# Patient Record
Sex: FEMALE | Race: WHITE | Employment: FULL TIME | URBAN - METROPOLITAN AREA
[De-identification: names, ages, dates, MRNs, and addresses within clinical notes are randomized per-mention and may not be internally consistent; named-entity substitution may affect disease eponyms.]

---

## 2022-08-11 ENCOUNTER — OFFICE VISIT (OUTPATIENT)
Dept: URGENT CARE | Age: 67
End: 2022-08-11
Payer: MEDICARE

## 2022-08-11 VITALS
HEART RATE: 50 BPM | SYSTOLIC BLOOD PRESSURE: 143 MMHG | DIASTOLIC BLOOD PRESSURE: 85 MMHG | OXYGEN SATURATION: 96 % | HEIGHT: 65 IN | TEMPERATURE: 97.2 F | BODY MASS INDEX: 28.99 KG/M2 | WEIGHT: 174 LBS | RESPIRATION RATE: 16 BRPM

## 2022-08-11 DIAGNOSIS — H00.014 HORDEOLUM EXTERNUM OF LEFT UPPER EYELID: Primary | ICD-10-CM

## 2022-08-11 DIAGNOSIS — H01.004 BLEPHARITIS OF LEFT UPPER EYELID, UNSPECIFIED TYPE: ICD-10-CM

## 2022-08-11 PROCEDURE — 1123F ACP DISCUSS/DSCN MKR DOCD: CPT | Performed by: NURSE PRACTITIONER

## 2022-08-11 PROCEDURE — 99203 OFFICE O/P NEW LOW 30 MIN: CPT | Performed by: NURSE PRACTITIONER

## 2022-08-11 RX ORDER — FLECAINIDE ACETATE 50 MG/1
50 TABLET ORAL 2 TIMES DAILY
COMMUNITY

## 2022-08-11 RX ORDER — DOXYCYCLINE 100 MG/1
100 TABLET ORAL 2 TIMES DAILY
Qty: 20 TABLET | Refills: 0 | Status: SHIPPED | OUTPATIENT
Start: 2022-08-11 | End: 2022-08-21

## 2022-08-11 RX ORDER — ATORVASTATIN CALCIUM 20 MG/1
20 TABLET, FILM COATED ORAL DAILY
COMMUNITY

## 2022-08-11 RX ORDER — ASPIRIN 325 MG
325 TABLET ORAL DAILY
COMMUNITY

## 2022-08-11 RX ORDER — LORATADINE 10 MG/1
10 TABLET ORAL
COMMUNITY

## 2022-08-11 RX ORDER — METOPROLOL TARTRATE 25 MG/1
25 TABLET, FILM COATED ORAL 2 TIMES DAILY
COMMUNITY

## 2022-08-11 NOTE — PROGRESS NOTES
Here for left eye lid swelling  Onset 3 days ago  No injury  Has had multiple flare ups in past  Been diagnosed with blepharitis   Denies fever, severe eye pain or blurred vision       Past Medical History:   Diagnosis Date    Cancer ([facility] [address] 1987,     LEFT BREAST    Cancer ([facility] [address]     BCCA    Cancer ([facility] [address] 2012    R BREAST    GERD (gastroesophageal reflux disease)     no problems since chemo    Other ill-defined conditions(799.89)     migraines    Other ill-defined conditions(799.89)     KIDNEY STONES    Seizures ([facility] [address]     AS A CHILD, due to trauma        Past Surgical History:   Procedure Laterality Date    HX AFIB ABLATION      HX BREAST AUGMENTATION  05/07/2013    BREAST CAPSULECTOMY performed by Lei Hernandez MD at [place] [id number]    HX CATARACT REMOVAL      BILATERAL    HX HEENT      TMJ sgy, 2008    HX MASTECTOMY  01/01/1987    LEFT    HX MASTECTOMY  02/01/2013    RIGHT    HX OTHER SURGICAL      basal cell carcinoma removed from nose     HX OTHER SURGICAL      PORTACATH, right , REMOVED 2/2013    HX TONSILLECTOMY      HX VASCULAR ACCESS      ALEJANDRA CATH REMOVED 2/2013         Family History   Problem Relation Age of Onset    Cancer Mother         LUNG    Cancer Maternal Grandmother         UTERINE    Anesth Problems Neg Hx         Social History     Socioeconomic History    Marital status:      Spouse name: Not on file    Number of children: Not on file    Years of education: Not on file    Highest education level: Not on file   Occupational History    Not on file   Tobacco Use    Smoking status: Never    Smokeless tobacco: Never   Substance and Sexual Activity    Alcohol use:  Yes     Alcohol/week: 1.7 standard drinks     Types: 2 Glasses of wine per week     Comment: 2 glasses wine a week    Drug use: No    Sexual activity: Not on file   Other Topics Concern    Not on file   Social History Narrative    Not on file     Social Determinants of Health     Financial Resource Strain: Not on file   Food Insecurity: Not on file   Transportation Needs: Not on file   Physical Activity: Not on file   Stress: Not on file   Social Connections: Not on file   Intimate Partner Violence: Not on file   Housing Stability: Not on file                ALLERGIES: Chlor allergy, Lortab [hydrocodone-acetaminophen], Other medication, and Penicillins    Review of Systems   All other systems reviewed and are negative. Vitals:    08/11/22 1222   BP: (!) 143/85   Pulse: (!) 50   Resp: 16   Temp: 97.2 °F (36.2 °C)   SpO2: 96%   Weight: 174 lb (78.9 kg)   Height: 5' 5\" (1.651 m)       Physical Exam  Constitutional:       Appearance: Normal appearance. Eyes:      General: No scleral icterus. Extraocular Movements: Extraocular movements intact. Conjunctiva/sclera: Conjunctivae normal.      Pupils: Pupils are equal, round, and reactive to light. Cardiovascular:      Rate and Rhythm: Normal rate and regular rhythm. Pulmonary:      Effort: Pulmonary effort is normal.      Breath sounds: Normal breath sounds. Skin:     Capillary Refill: Capillary refill takes less than 2 seconds. Coloration: Skin is not pale. Findings: No rash. Neurological:      Mental Status: She is alert and oriented to person, place, and time. Psychiatric:         Mood and Affect: Mood normal.         Behavior: Behavior normal.         Thought Content: Thought content normal.       MDM     Differential Diagnosis; Clinical Impression; Plan:       CLINICAL IMPRESSION:  (H00.014) Hordeolum externum of left upper eyelid  (primary encounter diagnosis)  (H01.004) Blepharitis of left upper eyelid, unspecified type    Orders Placed This Encounter      doxycycline (ADOXA) 100 mg tablet          Sig: Take 1 Tablet by mouth two (2) times a day for 10 days.           Dispense:  20 Tablet          Refill:  0      Plan:  Blepharitis with stye left upper lid  Start doxycycline   Warm moist compresses  See ophtalmology if not resolved over next 1 week.  Immediate follow up for new, worsening or changes                 Procedures

## 2022-10-22 ENCOUNTER — OFFICE VISIT (OUTPATIENT)
Dept: URGENT CARE | Age: 67
End: 2022-10-22
Payer: MEDICARE

## 2022-10-22 VITALS
SYSTOLIC BLOOD PRESSURE: 138 MMHG | HEART RATE: 67 BPM | WEIGHT: 175 LBS | RESPIRATION RATE: 18 BRPM | DIASTOLIC BLOOD PRESSURE: 77 MMHG | BODY MASS INDEX: 28.12 KG/M2 | TEMPERATURE: 98 F | OXYGEN SATURATION: 97 % | HEIGHT: 66 IN

## 2022-10-22 DIAGNOSIS — B96.89 ACUTE BACTERIAL BRONCHITIS: Primary | ICD-10-CM

## 2022-10-22 DIAGNOSIS — J20.8 ACUTE BACTERIAL BRONCHITIS: Primary | ICD-10-CM

## 2022-10-22 PROCEDURE — 3017F COLORECTAL CA SCREEN DOC REV: CPT | Performed by: NURSE PRACTITIONER

## 2022-10-22 PROCEDURE — G8417 CALC BMI ABV UP PARAM F/U: HCPCS | Performed by: NURSE PRACTITIONER

## 2022-10-22 PROCEDURE — G8400 PT W/DXA NO RESULTS DOC: HCPCS | Performed by: NURSE PRACTITIONER

## 2022-10-22 PROCEDURE — 1123F ACP DISCUSS/DSCN MKR DOCD: CPT | Performed by: NURSE PRACTITIONER

## 2022-10-22 PROCEDURE — G8536 NO DOC ELDER MAL SCRN: HCPCS | Performed by: NURSE PRACTITIONER

## 2022-10-22 PROCEDURE — 99213 OFFICE O/P EST LOW 20 MIN: CPT | Performed by: NURSE PRACTITIONER

## 2022-10-22 PROCEDURE — 1090F PRES/ABSN URINE INCON ASSESS: CPT | Performed by: NURSE PRACTITIONER

## 2022-10-22 PROCEDURE — G8427 DOCREV CUR MEDS BY ELIG CLIN: HCPCS | Performed by: NURSE PRACTITIONER

## 2022-10-22 PROCEDURE — 1101F PT FALLS ASSESS-DOCD LE1/YR: CPT | Performed by: NURSE PRACTITIONER

## 2022-10-22 PROCEDURE — G8432 DEP SCR NOT DOC, RNG: HCPCS | Performed by: NURSE PRACTITIONER

## 2022-10-22 RX ORDER — DOXYCYCLINE 100 MG/1
100 TABLET ORAL 2 TIMES DAILY
Qty: 14 TABLET | Refills: 0 | Status: SHIPPED | OUTPATIENT
Start: 2022-10-22 | End: 2022-10-29

## 2022-10-22 RX ORDER — GUAIFENESIN 600 MG/1
600 TABLET, EXTENDED RELEASE ORAL 2 TIMES DAILY
Qty: 20 TABLET | Refills: 0 | Status: SHIPPED | OUTPATIENT
Start: 2022-10-22 | End: 2022-11-01

## 2022-10-22 RX ORDER — BENZONATATE 200 MG/1
200 CAPSULE ORAL
Qty: 21 CAPSULE | Refills: 0 | Status: SHIPPED | OUTPATIENT
Start: 2022-10-22 | End: 2022-10-29

## 2022-10-22 NOTE — PROGRESS NOTES
Subjective: (As above and below)     The patient/guardian gave verbal consent to treat. Chief Complaint   Patient presents with    Cough     Pt kept grandson over w/e. He had cold, possible flu/RSV sx's. Now on abx. Throughout week, she has developed a productive cough w/green mucus, chills, chest congestion, nasal congestion and fatigue. Leta Worley is a 79 y.o. female who presents for evaluation of : cough lingering after 7+ days of cold like symptoms cough, runny nose, nasal congestion. Symptom . No other identified aggravating or alleviating factors. Symptoms are constant and overall. Promotes no decrease in PO intake of fluids. Denies: severe lethargy, SOB, vomiting/diarrhea, chest pain, chest pain with breathing, severe headache, non-intractable fevers. ROS  Review of Systems - negative except as listed above    Reviewed PmHx, RxHx, FmHx, SocHx, AllgHx and updated in chart. Family History   Problem Relation Age of Onset    Cancer Mother         LUNG    Cancer Maternal Grandmother         UTERINE    Anesth Problems Neg Hx        Past Medical History:   Diagnosis Date    Cancer ([facility] [address] 1987,     LEFT BREAST    Cancer ([facility] [address]     BCCA    Cancer ([facility] [address] 2012    R BREAST    GERD (gastroesophageal reflux disease)     no problems since chemo    Other ill-defined conditions(799.89)     migraines    Other ill-defined conditions(799.89)     KIDNEY STONES    Seizures ([facility] [address]     AS A CHILD, due to trauma      Social History     Socioeconomic History    Marital status:    Tobacco Use    Smoking status: Never    Smokeless tobacco: Never   Substance and Sexual Activity    Alcohol use: Yes     Alcohol/week: 1.7 standard drinks     Types: 2 Glasses of wine per week     Comment: 2 glasses wine a week    Drug use: No          Current Outpatient Medications   Medication Sig    doxycycline (ADOXA) 100 mg tablet Take 1 Tablet by mouth two (2) times a day for 7 days.     benzonatate (TESSALON) 200 mg capsule Take 1 Capsule by mouth three (3) times daily as needed for Cough for up to 7 days. guaiFENesin ER (MUCINEX) 600 mg ER tablet Take 1 Tablet by mouth two (2) times a day for 10 days. flecainide (TAMBOCOR) 50 mg tablet Take 50 mg by mouth two (2) times a day. metoprolol tartrate (LOPRESSOR) 25 mg tablet Take 25 mg by mouth two (2) times a day. atorvastatin (Lipitor) 20 mg tablet Take 20 mg by mouth in the morning. aspirin (ASPIRIN) 325 mg tablet Take 325 mg by mouth in the morning. loratadine (Claritin) 10 mg tablet Take 10 mg by mouth. MAGNESIUM PO Take  by mouth. ibuprofen (MOTRIN) 600 mg tablet Take 1 Tab by mouth every six (6) hours as needed for Pain.    estradiol (ESTRACE) 0.01 % (0.1 mg/g) vaginal cream Insert 2 g into vagina daily. No current facility-administered medications for this visit. Objective:     Vitals:    10/22/22 0846 10/22/22 0848   BP:  138/77   Pulse:  67   Resp:  18   Temp:  98 °F (36.7 °C)   SpO2:  97%   Weight: 175 lb (79.4 kg)    Height: 5' 5.5\" (1.664 m)        Physical Exam  General appearance - appears well hydrated and does not appear toxic, no acute distress  Eyes - EOMs intact. Non injected. No scleral icterus   Ears - no external swelling. TMs normal bilat. Nose - nasal congestion. No purulent drainage  Mouth - OP clear without swelling, exudate or lesion. Mucus membranes moist. Uvula midline. Neck/Lymphatics - trachea midline, full AROM, no LAD of neck  Chest - Normal breathing effort no wheeze rales, diminishments bilaterally. Heart - RRR, no murmurs  Skin - no observable rashes or pallor  Neurologic- alert and oriented x 3  Psychiatric- normal mood, behavior and though content. Assessment/ Plan:     1. Acute bacterial bronchitis    - doxycycline (ADOXA) 100 mg tablet; Take 1 Tablet by mouth two (2) times a day for 7 days. Dispense: 14 Tablet; Refill: 0  - benzonatate (TESSALON) 200 mg capsule;  Take 1 Capsule by mouth three (3) times daily as needed for Cough for up to 7 days. Dispense: 21 Capsule; Refill: 0  - guaiFENesin ER (MUCINEX) 600 mg ER tablet; Take 1 Tablet by mouth two (2) times a day for 10 days. Dispense: 20 Tablet; Refill: 0    Given worsening after 1 week with productive cough will teat with doxycycline for possible early LRI/bacterial bronchitis  Ddx sinus infection  Humidified air  Tessalon and mucinex for cough relief  Re evaluation and consider CXR in 1 week if no improved by that time. Follow up: Follow up immediately for any new, worsening or changes or if symptoms are not improving over the next 5-7 days.          Joanne Casanova NP